# Patient Record
Sex: FEMALE | Race: WHITE | NOT HISPANIC OR LATINO | URBAN - METROPOLITAN AREA
[De-identification: names, ages, dates, MRNs, and addresses within clinical notes are randomized per-mention and may not be internally consistent; named-entity substitution may affect disease eponyms.]

---

## 2023-08-05 ENCOUNTER — OUTPATIENT (OUTPATIENT)
Dept: INPATIENT UNIT | Facility: HOSPITAL | Age: 33
LOS: 1 days | Discharge: HOME | End: 2023-08-05
Payer: MEDICAID

## 2023-08-05 VITALS
RESPIRATION RATE: 20 BRPM | SYSTOLIC BLOOD PRESSURE: 128 MMHG | DIASTOLIC BLOOD PRESSURE: 75 MMHG | TEMPERATURE: 99 F | HEART RATE: 77 BPM

## 2023-08-05 DIAGNOSIS — Z98.890 OTHER SPECIFIED POSTPROCEDURAL STATES: Chronic | ICD-10-CM

## 2023-08-05 DIAGNOSIS — N89.8 OTHER SPECIFIED NONINFLAMMATORY DISORDERS OF VAGINA: ICD-10-CM

## 2023-08-05 DIAGNOSIS — Z3A.00 WEEKS OF GESTATION OF PREGNANCY NOT SPECIFIED: ICD-10-CM

## 2023-08-05 DIAGNOSIS — O26.899 OTHER SPECIFIED PREGNANCY RELATED CONDITIONS, UNSPECIFIED TRIMESTER: ICD-10-CM

## 2023-08-05 LAB
APPEARANCE UR: CLEAR — SIGNIFICANT CHANGE UP
BACTERIA # UR AUTO: NEGATIVE /HPF — SIGNIFICANT CHANGE UP
BILIRUB UR-MCNC: NEGATIVE — SIGNIFICANT CHANGE UP
CAST: 0 /LPF — SIGNIFICANT CHANGE UP (ref 0–4)
COLOR SPEC: YELLOW — SIGNIFICANT CHANGE UP
DIFF PNL FLD: NEGATIVE — SIGNIFICANT CHANGE UP
GLUCOSE UR QL: NEGATIVE MG/DL — SIGNIFICANT CHANGE UP
HYALINE CASTS # UR AUTO: 0 /LPF — SIGNIFICANT CHANGE UP (ref 0–4)
KETONES UR-MCNC: NEGATIVE MG/DL — SIGNIFICANT CHANGE UP
LEUKOCYTE ESTERASE UR-ACNC: ABNORMAL
NITRITE UR-MCNC: NEGATIVE — SIGNIFICANT CHANGE UP
PH UR: 7 — SIGNIFICANT CHANGE UP (ref 5–8)
PROT UR-MCNC: NEGATIVE MG/DL — SIGNIFICANT CHANGE UP
RBC CASTS # UR COMP ASSIST: 0 /HPF — SIGNIFICANT CHANGE UP (ref 0–4)
SP GR SPEC: <1.005 — LOW (ref 1–1.03)
SQUAMOUS # UR AUTO: 4 /HPF — SIGNIFICANT CHANGE UP (ref 0–5)
UROBILINOGEN FLD QL: 0.2 MG/DL — SIGNIFICANT CHANGE UP (ref 0.2–1)
WBC UR QL: 3 /HPF — SIGNIFICANT CHANGE UP (ref 0–5)

## 2023-08-05 PROCEDURE — 87086 URINE CULTURE/COLONY COUNT: CPT

## 2023-08-05 PROCEDURE — 81001 URINALYSIS AUTO W/SCOPE: CPT

## 2023-08-05 NOTE — OB PROVIDER TRIAGE NOTE - HISTORY OF PRESENT ILLNESS
33y  at 35w5d by first trimester sonogram with ANNEL 9/3 presenting due to leakage of fluid that occurred overnight. Patient reports she awoke feeling wet and that she wasnt sure if it was urine or amniotic fluid. Denies CTX, VB. Good FM. Denies complications this pregnancy. GBS unknown 33y  at 35w5d by first trimester sonogram with ANNEL 9/3 presenting due to leakage of fluid that occurred overnight. Patient reports she awoke feeling wet and that she wasnt sure if it was urine or amniotic fluid. Denies CTX, VB. Good FM. Denies complications this pregnancy. GBS unknown    Last PO: yesterday evening  Last BM: this AM  Last intercourse last night

## 2023-08-05 NOTE — OB PROVIDER TRIAGE NOTE - NSOBPROVIDERNOTE_OBGYN_ALL_OB_FT
33y  at 35w6d, GBS unknown, membranes not ruptured, reassuring maternal and fetal status,  -discussed labor precuations  - [facility] discussed  - f/u in office for scheduled appt  - discharge to home  Dr. Atkinson aware

## 2023-08-05 NOTE — OB PROVIDER TRIAGE NOTE - NS_OBGYNHISTORY_OBGYN_ALL_OB_FT
OB: FT NSVDx2 largest 6.8  IOL at 36w for preeclampsia, 6lb  SABx2, D+Cx2    GYN: denies history of fibroids, ovarian cysts, abnormal papsmears, and STIs

## 2023-08-05 NOTE — OB PROVIDER TRIAGE NOTE - NSHPPHYSICALEXAM_GEN_ALL_CORE
T(C): 37.4 (08-05-23 @ 09:31), Max: 37.4 (08-05-23 @ 09:31)  HR: 77 (08-05-23 @ 09:31) (77 - 77)  BP: 128/75 (08-05-23 @ 09:31) (128/75 - 128/75)  RR: 20 (08-05-23 @ 09:31) (20 - 20)    Gen: A+OX3. NAD  Cardio: RRR  Resp: CTAB  Abd: Soft, Nontender. Gravid. No palpable contractions  SVE:  per    Speculum:  BSS:    FHR: 135BPM/mod daniel/accels pos  TOCO: irregular T(C): 37.4 (08-05-23 @ 09:31), Max: 37.4 (08-05-23 @ 09:31)  HR: 77 (08-05-23 @ 09:31) (77 - 77)  BP: 128/75 (08-05-23 @ 09:31) (128/75 - 128/75)  RR: 20 (08-05-23 @ 09:31) (20 - 20)    Gen: A+OX3. NAD  Cardio: RRR  Resp: CTAB  Abd: Soft, Nontender. Gravid. No palpable contractions  SVE: 2/0/-3. membranes palpated  Speculum: limited due to patient discomfort, patient clenching and forcing speculum out, no pooling or bleeding noted  BSS: vtx, posterior placenta, BPP 8/8, EFW 2781gm 49.9%    FHR: 135BPM/mod daniel/accels pos  TOCO: irregular

## 2023-08-05 NOTE — OB PROVIDER TRIAGE NOTE - NSHPLABSRESULTS_GEN_ALL_CORE
1/19  Type and Screen: B pos  Antibody screen: neg   Rubella: immune  RPR: neg  HbSAg: neg  HIV: NR    Second Trimester:  1 hour Glucola: 118    Third Trimester:  GBS: pending, done 8/1    Sonogram: 1/19  Type and Screen: B pos  Antibody screen: neg   Rubella: immune  RPR: neg  HbSAg: neg  HIV: NR    Second Trimester:  1 hour Glucola: 118    Third Trimester:  GBS: pending, done 8/1    Sonogram:  not available, see BSS from this visit

## 2023-08-05 NOTE — OB PROVIDER TRIAGE NOTE - ATTENDING COMMENTS
33y  at 35w5d presented for rule out rupture, not ruptured.    SVE: 2/0/-3    Tracing cat 1  Contraction x 1     Plan:  - labor precautions  - f/u UA Ucx  - dc home

## 2023-08-07 ENCOUNTER — INPATIENT (INPATIENT)
Facility: HOSPITAL | Age: 33
LOS: 0 days | Discharge: HOME | DRG: 560 | End: 2023-08-08
Attending: OBSTETRICS & GYNECOLOGY | Admitting: STUDENT IN AN ORGANIZED HEALTH CARE EDUCATION/TRAINING PROGRAM
Payer: MEDICAID

## 2023-08-07 VITALS — HEART RATE: 74 BPM | DIASTOLIC BLOOD PRESSURE: 67 MMHG | SYSTOLIC BLOOD PRESSURE: 109 MMHG

## 2023-08-07 DIAGNOSIS — Z3A.00 WEEKS OF GESTATION OF PREGNANCY NOT SPECIFIED: ICD-10-CM

## 2023-08-07 DIAGNOSIS — O47.1 FALSE LABOR AT OR AFTER 37 COMPLETED WEEKS OF GESTATION: ICD-10-CM

## 2023-08-07 DIAGNOSIS — Z98.890 OTHER SPECIFIED POSTPROCEDURAL STATES: Chronic | ICD-10-CM

## 2023-08-07 DIAGNOSIS — O26.899 OTHER SPECIFIED PREGNANCY RELATED CONDITIONS, UNSPECIFIED TRIMESTER: ICD-10-CM

## 2023-08-07 PROBLEM — Z86.69 PERSONAL HISTORY OF OTHER DISEASES OF THE NERVOUS SYSTEM AND SENSE ORGANS: Chronic | Status: ACTIVE | Noted: 2023-08-05

## 2023-08-07 LAB
BASOPHILS # BLD AUTO: 0.03 K/UL — SIGNIFICANT CHANGE UP (ref 0–0.2)
BASOPHILS NFR BLD AUTO: 0.4 % — SIGNIFICANT CHANGE UP (ref 0–1)
BLD GP AB SCN SERPL QL: SIGNIFICANT CHANGE UP
EOSINOPHIL # BLD AUTO: 0.02 K/UL — SIGNIFICANT CHANGE UP (ref 0–0.7)
EOSINOPHIL NFR BLD AUTO: 0.3 % — SIGNIFICANT CHANGE UP (ref 0–8)
HCT VFR BLD CALC: 42.8 % — SIGNIFICANT CHANGE UP (ref 37–47)
HGB BLD-MCNC: 14.4 G/DL — SIGNIFICANT CHANGE UP (ref 12–16)
IMM GRANULOCYTES NFR BLD AUTO: 0.7 % — HIGH (ref 0.1–0.3)
LYMPHOCYTES # BLD AUTO: 1.38 K/UL — SIGNIFICANT CHANGE UP (ref 1.2–3.4)
LYMPHOCYTES # BLD AUTO: 18.3 % — LOW (ref 20.5–51.1)
MCHC RBC-ENTMCNC: 32.4 PG — HIGH (ref 27–31)
MCHC RBC-ENTMCNC: 33.6 G/DL — SIGNIFICANT CHANGE UP (ref 32–37)
MCV RBC AUTO: 96.4 FL — SIGNIFICANT CHANGE UP (ref 81–99)
MONOCYTES # BLD AUTO: 0.63 K/UL — HIGH (ref 0.1–0.6)
MONOCYTES NFR BLD AUTO: 8.3 % — SIGNIFICANT CHANGE UP (ref 1.7–9.3)
NEUTROPHILS # BLD AUTO: 5.44 K/UL — SIGNIFICANT CHANGE UP (ref 1.4–6.5)
NEUTROPHILS NFR BLD AUTO: 72 % — SIGNIFICANT CHANGE UP (ref 42.2–75.2)
NRBC # BLD: 0 /100 WBCS — SIGNIFICANT CHANGE UP (ref 0–0)
PLATELET # BLD AUTO: 172 K/UL — SIGNIFICANT CHANGE UP (ref 130–400)
PMV BLD: 12.1 FL — HIGH (ref 7.4–10.4)
PRENATAL SYPHILIS TEST: SIGNIFICANT CHANGE UP
RBC # BLD: 4.44 M/UL — SIGNIFICANT CHANGE UP (ref 4.2–5.4)
RBC # FLD: 13.1 % — SIGNIFICANT CHANGE UP (ref 11.5–14.5)
WBC # BLD: 7.55 K/UL — SIGNIFICANT CHANGE UP (ref 4.8–10.8)
WBC # FLD AUTO: 7.55 K/UL — SIGNIFICANT CHANGE UP (ref 4.8–10.8)

## 2023-08-07 PROCEDURE — 86592 SYPHILIS TEST NON-TREP QUAL: CPT

## 2023-08-07 PROCEDURE — 86900 BLOOD TYPING SEROLOGIC ABO: CPT

## 2023-08-07 PROCEDURE — 85025 COMPLETE CBC W/AUTO DIFF WBC: CPT

## 2023-08-07 PROCEDURE — 36415 COLL VENOUS BLD VENIPUNCTURE: CPT

## 2023-08-07 PROCEDURE — 86850 RBC ANTIBODY SCREEN: CPT

## 2023-08-07 PROCEDURE — 86901 BLOOD TYPING SEROLOGIC RH(D): CPT

## 2023-08-07 PROCEDURE — 59050 FETAL MONITOR W/REPORT: CPT

## 2023-08-07 RX ORDER — MAGNESIUM HYDROXIDE 400 MG/1
30 TABLET, CHEWABLE ORAL
Refills: 0 | Status: DISCONTINUED | OUTPATIENT
Start: 2023-08-07 | End: 2023-08-08

## 2023-08-07 RX ORDER — DIBUCAINE 1 %
1 OINTMENT (GRAM) RECTAL EVERY 6 HOURS
Refills: 0 | Status: DISCONTINUED | OUTPATIENT
Start: 2023-08-07 | End: 2023-08-08

## 2023-08-07 RX ORDER — BENZOCAINE 10 %
1 GEL (GRAM) MUCOUS MEMBRANE EVERY 6 HOURS
Refills: 0 | Status: DISCONTINUED | OUTPATIENT
Start: 2023-08-07 | End: 2023-08-08

## 2023-08-07 RX ORDER — TETANUS TOXOID, REDUCED DIPHTHERIA TOXOID AND ACELLULAR PERTUSSIS VACCINE, ADSORBED 5; 2.5; 8; 8; 2.5 [IU]/.5ML; [IU]/.5ML; UG/.5ML; UG/.5ML; UG/.5ML
0.5 SUSPENSION INTRAMUSCULAR ONCE
Refills: 0 | Status: DISCONTINUED | OUTPATIENT
Start: 2023-08-07 | End: 2023-08-08

## 2023-08-07 RX ORDER — AER TRAVELER 0.5 G/1
1 SOLUTION RECTAL; TOPICAL EVERY 4 HOURS
Refills: 0 | Status: DISCONTINUED | OUTPATIENT
Start: 2023-08-07 | End: 2023-08-08

## 2023-08-07 RX ORDER — SODIUM CHLORIDE 9 MG/ML
3 INJECTION INTRAMUSCULAR; INTRAVENOUS; SUBCUTANEOUS EVERY 8 HOURS
Refills: 0 | Status: DISCONTINUED | OUTPATIENT
Start: 2023-08-07 | End: 2023-08-08

## 2023-08-07 RX ORDER — SODIUM CHLORIDE 9 MG/ML
1000 INJECTION, SOLUTION INTRAVENOUS
Refills: 0 | Status: DISCONTINUED | OUTPATIENT
Start: 2023-08-07 | End: 2023-08-07

## 2023-08-07 RX ORDER — OXYCODONE HYDROCHLORIDE 5 MG/1
5 TABLET ORAL ONCE
Refills: 0 | Status: DISCONTINUED | OUTPATIENT
Start: 2023-08-07 | End: 2023-08-08

## 2023-08-07 RX ORDER — SIMETHICONE 80 MG/1
80 TABLET, CHEWABLE ORAL EVERY 4 HOURS
Refills: 0 | Status: DISCONTINUED | OUTPATIENT
Start: 2023-08-07 | End: 2023-08-08

## 2023-08-07 RX ORDER — LANOLIN
1 OINTMENT (GRAM) TOPICAL EVERY 6 HOURS
Refills: 0 | Status: DISCONTINUED | OUTPATIENT
Start: 2023-08-07 | End: 2023-08-08

## 2023-08-07 RX ORDER — KETOROLAC TROMETHAMINE 30 MG/ML
30 SYRINGE (ML) INJECTION ONCE
Refills: 0 | Status: DISCONTINUED | OUTPATIENT
Start: 2023-08-07 | End: 2023-08-07

## 2023-08-07 RX ORDER — DIPHENHYDRAMINE HCL 50 MG
25 CAPSULE ORAL EVERY 6 HOURS
Refills: 0 | Status: DISCONTINUED | OUTPATIENT
Start: 2023-08-07 | End: 2023-08-08

## 2023-08-07 RX ORDER — ACETAMINOPHEN 500 MG
975 TABLET ORAL
Refills: 0 | Status: DISCONTINUED | OUTPATIENT
Start: 2023-08-07 | End: 2023-08-08

## 2023-08-07 RX ORDER — IBUPROFEN 200 MG
600 TABLET ORAL EVERY 6 HOURS
Refills: 0 | Status: DISCONTINUED | OUTPATIENT
Start: 2023-08-07 | End: 2023-08-08

## 2023-08-07 RX ORDER — OXYTOCIN 10 UNIT/ML
333.33 VIAL (ML) INJECTION
Qty: 20 | Refills: 0 | Status: DISCONTINUED | OUTPATIENT
Start: 2023-08-07 | End: 2023-08-07

## 2023-08-07 RX ORDER — OXYCODONE HYDROCHLORIDE 5 MG/1
5 TABLET ORAL
Refills: 0 | Status: DISCONTINUED | OUTPATIENT
Start: 2023-08-07 | End: 2023-08-08

## 2023-08-07 RX ORDER — IBUPROFEN 200 MG
600 TABLET ORAL EVERY 6 HOURS
Refills: 0 | Status: COMPLETED | OUTPATIENT
Start: 2023-08-07 | End: 2024-07-05

## 2023-08-07 RX ADMIN — Medication 975 MILLIGRAM(S): at 15:48

## 2023-08-07 RX ADMIN — Medication 600 MILLIGRAM(S): at 23:32

## 2023-08-07 RX ADMIN — Medication 30 MILLIGRAM(S): at 09:34

## 2023-08-07 RX ADMIN — Medication 975 MILLIGRAM(S): at 21:41

## 2023-08-07 RX ADMIN — Medication 975 MILLIGRAM(S): at 16:20

## 2023-08-07 RX ADMIN — Medication 1 TABLET(S): at 13:03

## 2023-08-07 RX ADMIN — Medication 30 MILLIGRAM(S): at 10:06

## 2023-08-07 RX ADMIN — Medication 975 MILLIGRAM(S): at 21:10

## 2023-08-07 RX ADMIN — SODIUM CHLORIDE 3 MILLILITER(S): 9 INJECTION INTRAMUSCULAR; INTRAVENOUS; SUBCUTANEOUS at 13:00

## 2023-08-07 RX ADMIN — Medication 600 MILLIGRAM(S): at 18:33

## 2023-08-07 NOTE — OB PROVIDER H&P - NSLOWPPHRISK_OBGYN_A_OB
No previous uterine incision/[place] Pregnancy/Less than or equal to 4 previous vaginal births/No known bleeding disorder/No history of postpartum hemorrhage

## 2023-08-07 NOTE — OB PROVIDER H&P - ATTENDING COMMENTS
34yo  at 36w1d, SROM this AM, in active labor.  Rh pos  GBS neg    Uncomplicated prenatal course    EFW: 3600g    Plan:  - admit to LD  - cont TOCO/EFM  - anticipate

## 2023-08-07 NOTE — OB PROVIDER DELIVERY SUMMARY - NSPROVIDERDELIVERYNOTE_OBGYN_ALL_OB_FT
Patient was fully dilated and pushing. Fetal head was OA and restituted to LOT. Tight nuchal x 2 noted, unable to be reduced. The anterior and posterior shoulders delivered, followed by the remaining body atraumatically at 838. Nuchal reduced upon delivery of the body. The  was placed on maternal abdomen and stimulated. Baby gave a good cry on the field. Delayed cord clamping was performed, and then clamped and cut. Cord blood gases collected x2. Pitocin was administered. The placenta delivered intact with membranes at 844. Uterus massaged, fundus found to be firm. Cervix, vagina and perineum inspected and a small second degree laceration was noted, repaired using  2-0 chromic in the usual fashion with good hemostasis.     Viable male infant delivered, weighing 3210g, 7lb 1 oz with APGARs 9/9    Lacteration: 2nd degree  EBL: 150 cc

## 2023-08-07 NOTE — OB RN PATIENT PROFILE - HEART RATE (BEATS/MIN)
Dc instructions and medications discussed with patient at bedside. All questions answered at this time. VSS. No IV in place at this time. Pt to lobby without incident. self to drive patient home.     
MD at bedside.   
Patient transported to imaging  
74

## 2023-08-07 NOTE — OB PROVIDER H&P - NSHPLABSRESULTS_GEN_ALL_CORE
1/19  Type and Screen: B pos  Antibody screen: neg   Rubella: immune  RPR: neg  HbSAg: neg  HIV: NR    Second Trimester:  1 hour Glucola: 118    Third Trimester:  HIV neg  GBS: pending, done 8/1 1/19  Type and Screen: B pos  Antibody screen: neg   Rubella: immune  RPR: neg  HbSAg: neg  HIV: NR    Second Trimester:  1 hour Glucola: 118    Third Trimester:  HIV neg  GBS: neg per Dr. Atkinson

## 2023-08-07 NOTE — OB PROVIDER H&P - NSDELIVERYTYPE2_OBGYN_ALL_OB
ROOM # 8    Cinthia Ta presents today for   Chief Complaint   Patient presents with    Ringing in Ear     L ear; no pain per patient just cant hear from that side x1 week        Cinthia Gayle preferred language for health care discussion is english/other. Is someone accompanying this pt? no    Is the patient using any DME equipment during OV? no    Depression Screening:  PHQ over the last two weeks 7/13/2017 5/26/2017 3/17/2017 3/1/2017 3/15/2016 4/9/2015   Little interest or pleasure in doing things Not at all Not at all Not at all Not at all Not at all Not at all   Feeling down, depressed or hopeless Not at all Not at all Not at all Not at all Not at all Not at all   Total Score PHQ 2 0 0 0 0 0 0       Learning Assessment:  Learning Assessment 5/26/2017 3/15/2016   PRIMARY LEARNER Patient Patient   HIGHEST LEVEL OF EDUCATION - PRIMARY LEARNER  [phone] CAREGIVER No No   PRIMARY LANGUAGE ENGLISH ENGLISH   LEARNER PREFERENCE PRIMARY DEMONSTRATION DEMONSTRATION   ANSWERED BY patient Patient   RELATIONSHIP SELF SELF       Abuse Screening:  No flowsheet data found. Health Maintenance reviewed and discussed per provider. Yes    William Hernandez is due for updated. Please order/place referral if appropriate. Advance Directive:  1. Do you have an advance directive in place? Patient Reply: no    2. If not, would you like material regarding how to put one in place? Patient Reply: no    Coordination of Care:  1. Have you been to the ER, urgent care clinic since your last visit? Hospitalized since your last visit? no    2. Have you seen or consulted any other health care providers outside of the [address] [place] [address] since your last visit? Include any pap smears or colon screening. no    Patient states that ear ringing became last Saturday while at beach. Patient did not do anything at the time because she thought it was just swimmers ear. Saskia Ring Vaginal

## 2023-08-07 NOTE — OB RN PATIENT PROFILE - FALL HARM RISK - HARM RISK INTERVENTIONS
Assistance with ambulation/Assistance OOB with selected safe patient handling equipment/Communicate Risk of Fall with Harm to all staff/Discuss with provider need for PT consult/Monitor gait and stability/Reinforce activity limits and safety measures with patient and family/Sit up slowly, dangle for a short time, stand at bedside before walking/Tailored Fall Risk Interventions/Visual Cue: Yellow wristband and red socks/Bed in lowest position, wheels locked, appropriate side rails in place/Call bell, personal items and telephone in reach/Instruct patient to call for assistance before getting out of bed or chair/Non-slip footwear when patient is out of bed/[place] to call system/Physically safe environment - no spills, clutter or unnecessary equipment/Purposeful Proactive Rounding/Room/bathroom lighting operational, light cord in reach

## 2023-08-07 NOTE — OB PROVIDER H&P - ASSESSMENT
34yo  at 36w1d, GBS unknown, in active labor.  -Admit to L+D, Precipitous delivery during admission.   -IVF and admission labs ordered  -Pain control PRN  -Monitor vitals  -crossed 2units pRBCs  -Postpartum labs ordered    Dr. Atkinson at bedside and Dr. Guy to be made aware 34yo  at 36w1d, GBS neg, in active labor.  -Admit to L+D, Precipitous delivery during admission.   -IVF and admission labs ordered  -Pain control PRN  -Monitor vitals  -Postpartum labs ordered    Dr. Atkinson at bedside and Dr. Guy to be made aware

## 2023-08-07 NOTE — OB RN DELIVERY SUMMARY - NSSELHIDDEN_OBGYN_ALL_OB_FT
[NS_DeliveryAttending1_OBGYN_ALL_OB_FT:[email or web address]=],[NS_DeliveryRN_OBGYN_ALL_OB_FT:[id number]=]

## 2023-08-07 NOTE — OB PROVIDER H&P - NSINFANTSEX1_OBGYN_ALL_OB
Patient is coming in for fasting labs on Wednesday for Dr. Corey. Patient just had COVID less than a month ago. Would you like anything else drawn at her lab appointment?    Male

## 2023-08-07 NOTE — OB PROVIDER H&P - HISTORY OF PRESENT ILLNESS
JESSICAMIKEY  32yo  at 36w1d ANNEL 23 dated by  trim judy, presents for ctx. Patient states XXX. Denies lof or vb. Good FM. Pregnancy c/o by h/o preeclampsia in P1 induced @ 36weeks, took aspirin 81mg this pregnancy. GBS unknown.    32yo  at 36w1d ANNEL 23 dated by  trim judy, presents for ctx. Patient states she felt leakage of fluid and worsening of her ctx this morning around 0530. Denies vb. Good FM. Pregnancy c/o by h/o preeclampsia in P1 induced @ 36weeks and h/o of cerebral palsy. GBS neg per Dr. Atkinson.

## 2023-08-07 NOTE — OB PROVIDER H&P - NS_OBGYNHISTORY_OBGYN_ALL_OB_FT
OB: FT NSVDx2 largest 6.8  IOL at 36w for preeclampsia, 6lb  SABx2, D+Cx1    GYN: denies history of fibroids, ovarian cysts, abnormal pap smears, and STIs

## 2023-08-07 NOTE — OB PROVIDER DELIVERY SUMMARY - NSSELHIDDEN_OBGYN_ALL_OB_FT
[NS_DeliveryAttending1_OBGYN_ALL_OB_FT:[email or web address]=],[NS_DeliveryRN_OBGYN_ALL_OB_FT:[id number]=]

## 2023-08-07 NOTE — OB PROVIDER H&P - NSHPPHYSICALEXAM_GEN_ALL_CORE
Vitals not collected do to precipitous delivery    Gen: A+OX3. NAD  Abd: Soft, Nontender. Gravid.  SVE:  8/100/0 vertex intact per Dr. Reyes     FHR: 130/mod/+accel/no decel  TOCO: q2-3min T(C): 36.7 (08-07-23 @ 09:22), Max: 36.7 (08-07-23 @ 09:22)  T(F): 98 (08-07-23 @ 09:22), Max: 98 (08-07-23 @ 09:22)  HR: 63 (08-07-23 @ 09:35) (63 - 74)  BP: 120/76 (08-07-23 @ 09:35) (109/67 - 120/76)  RR: 16 (08-07-23 @ 09:22) (16 - 16)    Gen: A+OX3. NAD  Abd: Soft, Nontender. Gravid.  SVE:  8/100/0 vertex intact per Dr. Reyes     FHR: 130/mod/+accel/no decel  TOCO: q2-3min

## 2023-08-08 VITALS
SYSTOLIC BLOOD PRESSURE: 127 MMHG | TEMPERATURE: 99 F | HEART RATE: 58 BPM | DIASTOLIC BLOOD PRESSURE: 74 MMHG | RESPIRATION RATE: 18 BRPM

## 2023-08-08 LAB
BASOPHILS # BLD AUTO: 0.04 K/UL — SIGNIFICANT CHANGE UP (ref 0–0.2)
BASOPHILS NFR BLD AUTO: 0.4 % — SIGNIFICANT CHANGE UP (ref 0–1)
CULTURE RESULTS: SIGNIFICANT CHANGE UP
EOSINOPHIL # BLD AUTO: 0.15 K/UL — SIGNIFICANT CHANGE UP (ref 0–0.7)
EOSINOPHIL NFR BLD AUTO: 1.3 % — SIGNIFICANT CHANGE UP (ref 0–8)
HCT VFR BLD CALC: 34.2 % — LOW (ref 37–47)
HGB BLD-MCNC: 11.4 G/DL — LOW (ref 12–16)
IMM GRANULOCYTES NFR BLD AUTO: 0.5 % — HIGH (ref 0.1–0.3)
LYMPHOCYTES # BLD AUTO: 18.5 % — LOW (ref 20.5–51.1)
LYMPHOCYTES # BLD AUTO: 2.07 K/UL — SIGNIFICANT CHANGE UP (ref 1.2–3.4)
MCHC RBC-ENTMCNC: 32.7 PG — HIGH (ref 27–31)
MCHC RBC-ENTMCNC: 33.3 G/DL — SIGNIFICANT CHANGE UP (ref 32–37)
MCV RBC AUTO: 98 FL — SIGNIFICANT CHANGE UP (ref 81–99)
MONOCYTES # BLD AUTO: 1.07 K/UL — HIGH (ref 0.1–0.6)
MONOCYTES NFR BLD AUTO: 9.6 % — HIGH (ref 1.7–9.3)
NEUTROPHILS # BLD AUTO: 7.77 K/UL — HIGH (ref 1.4–6.5)
NEUTROPHILS NFR BLD AUTO: 69.7 % — SIGNIFICANT CHANGE UP (ref 42.2–75.2)
NRBC # BLD: 0 /100 WBCS — SIGNIFICANT CHANGE UP (ref 0–0)
PLATELET # BLD AUTO: 144 K/UL — SIGNIFICANT CHANGE UP (ref 130–400)
PMV BLD: 12.5 FL — HIGH (ref 7.4–10.4)
RBC # BLD: 3.49 M/UL — LOW (ref 4.2–5.4)
RBC # FLD: 13.2 % — SIGNIFICANT CHANGE UP (ref 11.5–14.5)
SPECIMEN SOURCE: SIGNIFICANT CHANGE UP
WBC # BLD: 11.16 K/UL — HIGH (ref 4.8–10.8)
WBC # FLD AUTO: 11.16 K/UL — HIGH (ref 4.8–10.8)

## 2023-08-08 RX ORDER — IBUPROFEN 200 MG
1 TABLET ORAL
Qty: 0 | Refills: 0 | DISCHARGE
Start: 2023-08-08

## 2023-08-08 RX ORDER — ACETAMINOPHEN 500 MG
3 TABLET ORAL
Qty: 0 | Refills: 0 | DISCHARGE
Start: 2023-08-08

## 2023-08-08 RX ADMIN — Medication 600 MILLIGRAM(S): at 07:10

## 2023-08-08 RX ADMIN — Medication 600 MILLIGRAM(S): at 06:38

## 2023-08-08 RX ADMIN — Medication 975 MILLIGRAM(S): at 03:36

## 2023-08-08 RX ADMIN — Medication 975 MILLIGRAM(S): at 04:10

## 2023-08-08 RX ADMIN — Medication 1 TABLET(S): at 11:37

## 2023-08-08 RX ADMIN — Medication 600 MILLIGRAM(S): at 18:03

## 2023-08-08 RX ADMIN — Medication 600 MILLIGRAM(S): at 18:35

## 2023-08-08 RX ADMIN — Medication 600 MILLIGRAM(S): at 12:10

## 2023-08-08 RX ADMIN — SODIUM CHLORIDE 3 MILLILITER(S): 9 INJECTION INTRAMUSCULAR; INTRAVENOUS; SUBCUTANEOUS at 13:14

## 2023-08-08 RX ADMIN — Medication 600 MILLIGRAM(S): at 00:05

## 2023-08-08 RX ADMIN — Medication 600 MILLIGRAM(S): at 11:37

## 2023-08-08 NOTE — DISCHARGE NOTE OB - PATIENT PORTAL LINK FT
You can access the FollowMyHealth Patient Portal offered by [facility] by registering at the following website: [email or web address]. By joining [employer]’s FollowMyHealth portal, you will also be able to view your health information using other applications (apps) compatible with our system.

## 2023-08-08 NOTE — DISCHARGE NOTE OB - MEDICATION SUMMARY - MEDICATIONS TO TAKE
I will START or STAY ON the medications listed below when I get home from the hospital:    ibuprofen 600 mg oral tablet  -- 1 tab(s) by mouth every 6 hours  -- Indication: For vaginal delivery    acetaminophen 325 mg oral tablet  -- 3 tab(s) by mouth every 6 hours  -- Indication: For vaginal delivery    Prenatal Multivitamins with Folic Acid 1 mg oral tablet  -- 1 tab(s) by mouth once a day  -- Indication: For vaginal delivery

## 2023-08-08 NOTE — DISCHARGE NOTE OB - CARE PLAN
1 Principal Discharge DX:	Normal vaginal delivery  Assessment and plan of treatment:	Stable for discharge home after spontaneous vaginal delivery. Continue breastfeeding. Motrin and Tylenol for pain. Pelvic rest with nothing per vagina until follow up in 5-6 weeks.

## 2023-08-08 NOTE — PROGRESS NOTE ADULT - ASSESSMENT
34yo s/p , PPD#1, doing well.    Plan:  Routine postpartum care  Encourage breastfeeding, ambulation and PO hydration  Possible discharge home today, if not tomorrow AM. Instructions discussed

## 2023-08-08 NOTE — DISCHARGE NOTE OB - CARE PROVIDER_API CALL
Genie Maradiaga  Obstetrics and Gynecology  [address] [place] [address], [address] [place] [address]  Phone: [phone]  Fax: [phone]  Follow Up Time:

## 2023-08-08 NOTE — DISCHARGE NOTE OB - PLAN OF CARE
Stable for discharge home after spontaneous vaginal delivery. Continue breastfeeding. Motrin and Tylenol for pain. Pelvic rest with nothing per vagina until follow up in 5-6 weeks.

## 2023-08-08 NOTE — PROGRESS NOTE ADULT - SUBJECTIVE AND OBJECTIVE BOX
Patient seen and examined, doing well. Some cramping, worsened with breastfeeding. Moderate lochia. Ambulating and voiding without difficulty.    Objective:   Vital Signs Last 24 Hrs  T(C): 36.8 (07 Aug 2023 23:20), Max: 37.4 (07 Aug 2023 15:54)  T(F): 98.2 (07 Aug 2023 23:20), Max: 99.3 (07 Aug 2023 15:54)  HR: 67 (07 Aug 2023 23:20) (56 - 80)  BP: 107/55 (07 Aug 2023 23:20) (103/54 - 131/65)  BP(mean): --  RR: 18 (07 Aug 2023 23:20) (16 - 18)  SpO2: --      Gen: NAD  Abd: Soft, Nontender, Nondistended, Fundus firm below the umbilicus  Ext: no tenderness, mild edema    Labs:                        14.4   7.55  )-----------( 172      ( 07 Aug 2023 08:47 )             42.8       Medications:  acetaminophen     Tablet .. 975 milliGRAM(s) Oral <User Schedule>  benzocaine 20%/menthol 0.5% Spray 1 Spray(s) Topical every 6 hours PRN  dibucaine 1% Ointment 1 Application(s) Topical every 6 hours PRN  diphenhydrAMINE 25 milliGRAM(s) Oral every 6 hours PRN  diphtheria/tetanus/pertussis (acellular) Vaccine (Adacel) 0.5 milliLiter(s) IntraMuscular once  ibuprofen  Tablet. 600 milliGRAM(s) Oral every 6 hours  lanolin Ointment 1 Application(s) Topical every 6 hours PRN  magnesium hydroxide Suspension 30 milliLiter(s) Oral two times a day PRN  oxyCODONE    IR 5 milliGRAM(s) Oral every 3 hours PRN  oxyCODONE    IR 5 milliGRAM(s) Oral once PRN  prenatal multivitamin 1 Tablet(s) Oral daily  simethicone 80 milliGRAM(s) Chew every 4 hours PRN  sodium chloride 0.9% lock flush 3 milliLiter(s) IV Push every 8 hours  witch hazel Pads 1 Application(s) Topical every 4 hours PRN

## 2023-08-08 NOTE — DISCHARGE NOTE OB - NS MD DC FALL RISK RISK
For information on Fall & Injury Prevention, visit: [email or web address] OR  [email or web address] OR  https://www.cdc.gov/steadi/patient.html

## 2023-08-08 NOTE — DISCHARGE NOTE OB - HOSPITAL COURSE
34yo  who presented to L&D at at 36w1d gestation in labor. Normal spontaneous vaginal delivery with uncomplicated postpartum course. Stable for discharge home

## 2023-08-14 DIAGNOSIS — Z28.09 IMMUNIZATION NOT CARRIED OUT BECAUSE OF OTHER CONTRAINDICATION: ICD-10-CM

## 2023-08-14 DIAGNOSIS — G80.9 CEREBRAL PALSY, UNSPECIFIED: ICD-10-CM

## 2023-08-14 DIAGNOSIS — Z3A.36 36 WEEKS GESTATION OF PREGNANCY: ICD-10-CM
